# Patient Record
Sex: MALE | Race: WHITE
[De-identification: names, ages, dates, MRNs, and addresses within clinical notes are randomized per-mention and may not be internally consistent; named-entity substitution may affect disease eponyms.]

---

## 2020-04-04 ENCOUNTER — HOSPITAL ENCOUNTER (EMERGENCY)
Dept: HOSPITAL 11 - JP.ED | Age: 20
Discharge: HOME | End: 2020-04-04
Payer: COMMERCIAL
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
## 2020-04-04 DIAGNOSIS — Z88.6: ICD-10-CM

## 2020-04-04 DIAGNOSIS — S40.011A: Primary | ICD-10-CM

## 2020-04-04 DIAGNOSIS — Z88.0: ICD-10-CM

## 2020-04-06 NOTE — CR
Shoulder Comp Rt

 

CLINICAL HISTORY: Fall, pain

 

FINDINGS: There is no acute fracture or dislocation in the right shoulder.

Articular surfaces are smooth

 

Impression: Negative

## 2020-04-07 ENCOUNTER — HOSPITAL ENCOUNTER (EMERGENCY)
Dept: HOSPITAL 11 - JP.ED | Age: 20
Discharge: HOME | End: 2020-04-07
Payer: COMMERCIAL
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
## 2020-04-07 DIAGNOSIS — Z88.0: ICD-10-CM

## 2020-04-07 DIAGNOSIS — S93.491A: Primary | ICD-10-CM

## 2020-04-07 DIAGNOSIS — Z88.5: ICD-10-CM

## 2020-04-07 NOTE — EDM.PDOC
ED HPI GENERAL MEDICAL PROBLEM





- General


Chief Complaint: Upper Extremity Injury/Pain


Stated Complaint: RIGHT SHOULDER PAIN


Time Seen by Provider: 04/07/20 20:30


Source of Information: Reports: Patient


History Limitations: Reports: No Limitations





- History of Present Illness


INITIAL COMMENTS - FREE TEXT/NARRATIVE: 





19-year-old male returns with right shoulder pain.  He was seen 3 days ago and 

likely has an AC injury, although a rotator cuff injury is possible as well.  X-

ray was negative, he returns today for pain control and an inability to abduct 

the arm past 10 or 20 degrees.  No paresthesias.


Onset: Sudden


Duration: Day(s): (3 days ago)


Location: Reports: Upper Extremity, Right


Associated Symptoms: Reports: No Other Symptoms


  ** Right Upper Shoulder


Pain Score (Numeric/FACES): 7





- Related Data


 Allergies











Allergy/AdvReac Type Severity Reaction Status Date / Time


 


Penicillins Allergy  Hives Verified 04/07/20 20:37


 


tramadol Allergy  Hives Verified 04/07/20 20:37











Home Meds: 


 Home Meds





Hydrocodone/Acetaminophen [Hydrocodon-Acetaminophen 5-325] 1 tab PO Q6H 04/07/ 20 [History]











Past Medical History


Gastrointestinal History: Reports: Other (See Below)


Other Gastrointestinal History: lipoma removal on upper abd


Musculoskeletal History: Reports: Fracture





Social & Family History





- Family History


Family Medical History: Noncontributory





- Caffeine Use


Caffeine Use: Reports: Energy Drinks





Review of Systems





- Review of Systems


Review Of Systems: See Below


Constitutional: Denies: Fever


Respiratory: Reports: No Symptoms


Cardiovascular: Reports: No Symptoms


Musculoskeletal: Denies: Neck Pain


Skin: Denies: Bruising, Rash, Erythema


Neurological: Reports: No Symptoms





ED EXAM, GENERAL





- Physical Exam


Exam: See Below


Exam Limited By: No Limitations


General Appearance: Alert, No Apparent Distress


Neck: Supple, Non-Tender


Respiratory/Chest: No Respiratory Distress


Extremities: Other (Sling was removed, shoulder reexamined.  Patient is still 

real tender over the AC joint area but there is no step-off or crepitus.  He is 

unable to actively abduct his arm past 20 degrees, and has marked difficulty 

holding his arm out at 90 degrees after passive abduction.)





Course





- Vital Signs


Last Recorded V/S: 


 Last Vital Signs











Temp  97.3 F   04/07/20 20:34


 


Pulse  82   04/07/20 20:34


 


Resp  16   04/07/20 20:34


 


BP  145/90 H  04/07/20 20:34


 


Pulse Ox  97   04/07/20 20:34














- Orders/Labs/Meds


Orders: 


 Active Orders 24 hr











 Category Date Time Status


 


 Consult to [facility]opedic Clinic [CONS] Routine Cons  04/07/20 20:34 Active














- Re-Assessments/Exams


Free Text/Narrative Re-Assessment/Exam: 





04/07/20 20:43


Patient was given an additional 10 hydrocodone to use for extra pain control, 

and I asked him to see Dr. Tolbert in consultation over the next 48 hours.  

Information was obtained to contact the patient and hopefully he can be seen in 

the next few days.  He can continue to increase activity if tolerated.





Departure





- Departure


Time of Disposition: 20:53


Disposition: Home, Self-Care 01


Clinical Impression: 


Sprain of right shoulder


Qualifiers:


 Encounter type: subsequent encounter Shoulder sprain type: other part of 

shoulder region Qualified Code(s): S43.491D - Other sprain of right shoulder 

joint, subsequent encounter








- Discharge Information


Instructions:  Shoulder Sprain


Referrals: 


PCP,None [Primary Care Provider] - 


Forms:  ED Department Discharge


Care Plan Goals: 


Continue to wear the sling for comfort, and anti-inflammatory is beneficial and 

add stronger pain medication if needed.  Recheck with Dr. Tolbert in the next 

few days as discussed, they should be contacting you tomorrow for a time.





Sepsis Event Note





- Evaluation


Sepsis Screening Result: No Definite Risk





- Focused Exam


Vital Signs: 


 Vital Signs











  Temp Pulse Resp BP Pulse Ox


 


 04/07/20 20:34  97.3 F  82  16  145/90 H  97











Date Exam was Performed: 04/07/20


Time Exam was Performed: 21:57





- My Orders


Last 24 Hours: 


My Active Orders





04/07/20 20:34


Consult to Orthopedic Clinic [CONS] Routine 














- Assessment/Plan


Last 24 Hours: 


My Active Orders





04/07/20 20:34


Consult to Orthopedic Clinic [CONS] Routine

## 2020-07-02 ENCOUNTER — HOSPITAL ENCOUNTER (EMERGENCY)
Dept: HOSPITAL 11 - JP.ED | Age: 20
LOS: 1 days | Discharge: HOME | End: 2020-07-03
Payer: COMMERCIAL

## 2020-07-02 DIAGNOSIS — S43.491D: Primary | ICD-10-CM

## 2020-07-02 DIAGNOSIS — R00.0: ICD-10-CM

## 2020-07-02 DIAGNOSIS — F17.210: ICD-10-CM

## 2020-07-02 DIAGNOSIS — Z88.6: ICD-10-CM

## 2020-07-02 DIAGNOSIS — Z88.5: ICD-10-CM
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
## 2020-07-02 DIAGNOSIS — Z88.0: ICD-10-CM

## 2020-07-03 ENCOUNTER — HOSPITAL ENCOUNTER (EMERGENCY)
Dept: HOSPITAL 11 - JP.ED | Age: 20
Discharge: ANOTHER HOSPITAL | End: 2020-07-03
Payer: COMMERCIAL
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
## 2020-07-03 DIAGNOSIS — Z88.0: ICD-10-CM

## 2020-07-03 DIAGNOSIS — S46.911A: Primary | ICD-10-CM

## 2020-07-03 DIAGNOSIS — Z79.899: ICD-10-CM

## 2020-07-03 DIAGNOSIS — F17.210: ICD-10-CM

## 2020-07-03 DIAGNOSIS — Z88.5: ICD-10-CM

## 2020-07-03 NOTE — EDM.PDOC
ED HPI GENERAL MEDICAL PROBLEM





- General


Chief Complaint: Upper Extremity Injury/Pain


Stated Complaint: RIGHT SHOULDER INJURY


Time Seen by Provider: 07/03/20 01:25


Source of Information: Reports: Patient


History Limitations: Reports: No Limitations





- History of Present Illness


INITIAL COMMENTS - FREE TEXT/NARRATIVE: 





Patient presents for evaluation of pain in the right shoulder region after he 

slipped getting out of the shower tonight and landed on his right shoulder.  

There was no loss of consciousness.  Since that time, if he keeps his right arm 

close to his side, it feels better but not pain-free.  Overhead movements of the

arm are extremely uncomfortable but rotational movements of the arm are not too 

bad.  The left side was not injured.  He has some discomfort in the vicinity of 

the right shoulder blade but the majority of the pain is lateral to the right 

shoulder.


Onset: Today


Duration: Hour(s):


Location: Reports: Upper Extremity, Right


Quality: Reports: Ache, Dull


Severity: Moderate


Improves with: Reports: None


Worsens with: Reports: Movement


Associated Symptoms: Reports: No Other Symptoms


  ** Right Shoulder


Pain Score (Numeric/FACES): 7





- Related Data


                                    Allergies











Allergy/AdvReac Type Severity Reaction Status Date / Time


 


acetaminophen Allergy  Vomiting Verified 07/02/20 23:56





[From Tylenol-Codeine #3]     


 


codeine Allergy  Vomiting Verified 07/02/20 23:56





[From Tylenol-Codeine #3]     


 


Penicillins Allergy  Hives Verified 04/07/20 20:37


 


tramadol Allergy  Hives Verified 04/07/20 20:37











Home Meds: 


                                    Home Meds





Hydrocodone/Acetaminophen [Hydrocodon-Acetaminophen 5-325] 1 tab PO Q6H 04/07/20

[History]


Ketorolac [Toradol] 10 mg PO TID PRN 07/02/20 [History]











Past Medical History


Gastrointestinal History: Reports: Other (See Below)


Other Gastrointestinal History: lipoma removal on upper abd


Musculoskeletal History: Reports: Fracture, Other (See Below)


Other Musculoskeletal History: recurrent right shoulder injuries


Neurological History: Reports: Seizure





- Past Surgical History


GI Surgical History: Reports: Hernia, Inguinal





Social & Family History





- Family History


Family Medical History: Noncontributory





- Tobacco Use


Smoking Status *Q: Current Every Day Smoker


Years of Tobacco use: 6


Packs/Tins Daily: 0.5


Second Hand Smoke Exposure: No





- Caffeine Use


Caffeine Use: Reports: None





- Recreational Drug Use


Recreational Drug Use: No





Review of Systems





- Review of Systems


Review Of Systems: Comprehensive ROS is negative, except as noted in HPI.





ED EXAM, GENERAL





- Physical Exam


Exam: See Below


Free Text/Narrative:: 





This is a comfortable appearing male seated on the table in room 2.


Exam Limited By: No Limitations


General Appearance: Alert, No Apparent Distress


Respiratory/Chest: No Respiratory Distress, No Accessory Muscle Use


Cardiovascular: Regular Rate, Rhythm, Tachycardia


Back Exam: Other (There is diffuse discomfort over the right scapula.)


Extremities: Limited Range of Motion (Attempts at passive upward extension of 

the right humerus are met with pain.  Internal and external rotation of the 

humerus with the arm hanging straight down are not particularly uncomfortable.  

The acromioclavicular joint is tender but no crepitus is felt.)





Course





- Vital Signs


Last Recorded V/S: 


                                Last Vital Signs











Temp  36.0 C L  07/02/20 23:58


 


Pulse  104 H  07/02/20 23:58


 


Resp  16   07/02/20 23:58


 


BP  144/92 H  07/02/20 23:58


 


Pulse Ox  97   07/02/20 23:58














- Re-Assessments/Exams


Free Text/Narrative Re-Assessment/Exam: 





07/03/20 03:21


X-ray of right shoulder ordered and reviewed by me shows no acute findings.  The

 AC joint is normal in appearance.  I think he has contused and strained the 

shoulder but do not see anything alarming.  I recommend ice packs to the 

affected area 20 minutes off and on.  He should use ibuprofen 800 mg 3 times a 

day or naproxen 440 mg twice a day for discomfort.  He asked how it is that he 

would be able to work today and I recommend he discuss this with his supervisor.

  Certain activities such as driving do not appear to be is uncomfortable as 

overhead lifting.  He may be able to have some sort of lighter duty assignment 

today.  Questions answered.





Departure





- Departure


Time of Disposition: 03:24


Disposition: Home, Self-Care 01


Clinical Impression: 


Contusion of shoulder, right


Qualifiers:


 Encounter type: initial encounter Qualified Code(s): S40.011A - Contusion of 

right shoulder, initial encounter





Sprain of right shoulder


Qualifiers:


 Encounter type: subsequent encounter Shoulder sprain type: other part of 

shoulder region Qualified Code(s): S43.491D - Other sprain of right shoulder 

joint, subsequent encounter








- Discharge Information


Instructions:  Contusion, Easy-to-Read


Referrals: 


PCP,None [Primary Care Provider] - 


Forms:  ED Department Discharge


Additional Instructions: 


Cold packs to painful area 20 minutes off and on.  Avoid painful movements of 

the shoulder to the extent possible.  Ibuprofen 800 mg 3 times a day or naproxen

440 mg twice a day regularly over the next 5 days for pain.  Recheck in primary 

care if not improved in 7 to 10 days.





Sepsis Event Note (ED)





- Evaluation


Sepsis Screening Result: No Definite Risk





- Focused Exam


Vital Signs: 


                                   Vital Signs











  Temp Pulse Resp BP Pulse Ox


 


 07/02/20 23:58  36.0 C L  104 H  16  144/92 H  97


 


 07/02/20 23:45  36.0 C L  104 H  16  144/92 H  97

## 2020-07-03 NOTE — CRLCR
INDICATION: Fall onto right shoulder region pain



TECHNIQUE: Shoulder radiograph 3 views right



COMPARISON: 4/4/2020



FINDINGS: 



Bone: No acute fractures or aggressive bone lesions are identified. 



Joint: The glenohumeral joint is unremarkable. The acromioclavicular joint 

is unremarkable. 



Soft tissue: Unremarkable. The visualized hemithorax is unremarkable in 

appearance. No radiopaque foreign bodies are seen. 



IMPRESSION: 



1. No acute osseous injuries or abnormalities are noted. 



Dictated by: Hoang Pantoja MD @ 07/03/2020 02:20:51



(Electronically Signed)

## 2020-07-03 NOTE — EDM.PDOC
ED HPI GENERAL MEDICAL PROBLEM





- General


Chief Complaint: Upper Extremity Injury/Pain


Stated Complaint: R SHOULDER PAIN


Time Seen by Provider: 07/03/20 19:12


Source of Information: Reports: Patient


History Limitations: Reports: No Limitations





- History of Present Illness


INITIAL COMMENTS - FREE TEXT/NARRATIVE: 





19-year-old male injured his right shoulder last night when he fell into the 

wall while taking a shower.  He was seen in the ER by Dr. Lopez and x-ray was 

read as normal by the radiologist.  The patient was advised to take OTC 

analgesics.  He returns today because of distant and increased pain.  He has 

increased pain with any movement of the right arm.  He denies any injuries.  He 

denies any neck pain.  He has no prior neck problems.


  ** Right Shoulder


Pain Score (Numeric/FACES): 8





- Related Data


                                    Allergies











Allergy/AdvReac Type Severity Reaction Status Date / Time


 


acetaminophen Allergy  Vomiting Verified 07/03/20 18:50





[From Tylenol-Codeine #3]     


 


codeine Allergy  Vomiting Verified 07/03/20 18:50





[From Tylenol-Codeine #3]     


 


Penicillins Allergy  Hives Verified 07/03/20 18:50


 


tramadol Allergy  Hives Verified 07/03/20 18:50











Home Meds: 


                                    Home Meds





Ketorolac [Toradol] 10 mg PO TID PRN 07/02/20 [History]











Past Medical History


Gastrointestinal History: Reports: Other (See Below)


Other Gastrointestinal History: lipoma removal on upper abd


Musculoskeletal History: Reports: Fracture, Other (See Below)


Other Musculoskeletal History: recurrent right shoulder injuries


Neurological History: Reports: Seizure





- Past Surgical History


GI Surgical History: Reports: Hernia, Inguinal





Social & Family History





- Family History


Family Medical History: Noncontributory





- Tobacco Use


Smoking Status *Q: Current Every Day Smoker


Years of Tobacco use: 5


Packs/Tins Daily: 0.5





- Caffeine Use


Caffeine Use: Reports: None





- Recreational Drug Use


Recreational Drug Use: No





Review of Systems





- Review of Systems


Review Of Systems: See Below


Constitutional: Reports: No Symptoms


Respiratory: Reports: No Symptoms


Cardiovascular: Reports: No Symptoms


Musculoskeletal: Reports: Other (Shoulder pain)


Skin: Reports: No Symptoms


Neurological: Denies: Numbness, Weakness





ED EXAM, GENERAL





- Physical Exam


Exam: See Below


Exam Limited By: No Limitations


General Appearance: Alert, WD/WN, No Apparent Distress


Peripheral Pulses: 4+: Radial (R)


Extremities: Other (The patient has a normal-appearing right shoulder on 

inspection.  External rotation is almost to 90 degrees as is abduction.  He has 

tenderness on palpation of the anterior shoulder.  No bony deformities were 

palpated.)





Course





- Vital Signs


Text/Narrative:: 





This patient presents with a right shoulder injury sustained last night.  He had

 an x-ray when he visited the ER last night and it was read as normal by the 

radiologist.  He has pain with movement.  His exam shows nearly 90 degrees of 

abduction.  He has some restriction in external rotation.  Palpation of the arm 

reveals no bony abnormalities.  Neurovascular exam is normal and radial pulses 

good.  The patient has a right shoulder strain.  He was given a sling which she 

will use for couple days and then exercises as tolerated.  He was given 4 

tablets of Vicodin 5/325 mg 1 or 2 tablets every 6 hours as needed for pain.  He

 can use OTC analgesics.  He will follow-up with orthopedics or primary care 

next week if he is not improving.


Last Recorded V/S: 


                                Last Vital Signs











Temp  34.7 C L  07/03/20 18:51


 


Pulse  71   07/03/20 18:51


 


Resp  13   07/03/20 18:51


 


BP  127/85   07/03/20 18:51


 


Pulse Ox  96   07/03/20 18:51














- Orders/Labs/Meds


Orders: 


                               Active Orders 24 hr











 Category Date Time Status


 


 DME for Discharge [COMM] Stat Oth  07/03/20 19:11 Ordered














Departure





- Departure


Time of Disposition: 19:15


Disposition: Admitted As Inpatient 66


Condition: Good


Clinical Impression: 


 Right shoulder strain








- Discharge Information


*PRESCRIPTION DRUG MONITORING PROGRAM REVIEWED*: Yes


*COPY OF PRESCRIPTION DRUG MONITORING REPORT IN PATIENT MISSY: No


Referrals: 


PCP,None [Primary Care Provider] - 


Forms:  ED Department Discharge


Additional Instructions: 


The arm sling as needed.  Take Vicodin as needed for pain.  Follow-up with her 

primary care provider next week if you have persistent problems and see no 

improvement.  Return to the ER as needed.





Sepsis Event Note (ED)





- Evaluation


Sepsis Screening Result: No Definite Risk





- Focused Exam


Vital Signs: 


                                   Vital Signs











  Temp Pulse Resp BP Pulse Ox


 


 07/03/20 18:51  34.7 C L  71  13  127/85  96


 


 07/03/20 18:46  34.7 C L  71  13  127/85  96














- My Orders


Last 24 Hours: 


My Active Orders





07/03/20 19:11


DME for Discharge [COMM] Stat 














- Assessment/Plan


Last 24 Hours: 


My Active Orders





07/03/20 19:11


DME for Discharge [COMM] Stat

## 2020-07-07 ENCOUNTER — HOSPITAL ENCOUNTER (EMERGENCY)
Dept: HOSPITAL 11 - JP.ED | Age: 20
Discharge: HOME | End: 2020-07-07
Payer: COMMERCIAL

## 2020-07-07 DIAGNOSIS — M25.511: Primary | ICD-10-CM

## 2020-07-07 DIAGNOSIS — Z88.6: ICD-10-CM

## 2020-07-07 DIAGNOSIS — Z88.0: ICD-10-CM

## 2020-07-07 DIAGNOSIS — Z88.5: ICD-10-CM

## 2020-07-07 NOTE — EDM.PDOC
ED HPI GENERAL MEDICAL PROBLEM





- General


Chief Complaint: Upper Extremity Injury/Pain


Stated Complaint: RIGHT SHOULDER PAIN


Time Seen by Provider: 07/07/20 21:32


Source of Information: Reports: Patient


History Limitations: Reports: No Limitations





- History of Present Illness


INITIAL COMMENTS - FREE TEXT/NARRATIVE: 





19 years old male patient presented to the ER with chief complaint of right 

shoulder pain. Patient had a fall and was seen few days ago and had a negative 

x-ray. Was given prescription for Norco and NSAIDs. Patient stated it was 

helping but he ran out of [place] today. Complaining of continuous pain with any 

movement. No swelling or erythema or deformity. No other complaints.





- Related Data


                                    Allergies











Allergy/AdvReac Type Severity Reaction Status Date / Time


 


acetaminophen Allergy  Vomiting Verified 07/07/20 21:28





[From Tylenol-Codeine #3]     


 


codeine Allergy  Vomiting Verified 07/07/20 21:28





[From Tylenol-Codeine #3]     


 


Penicillins Allergy  Hives Verified 07/07/20 21:28


 


tramadol Allergy  Hives Verified 07/07/20 21:28











Home Meds: 


                                    Home Meds





Ketorolac [Toradol] 10 mg PO TID PRN 07/02/20 [History]


Hydrocodone/Acetaminophen [Hydrocodon-Acetaminophen 5-325] 1 each PO ASDIRECTED 

07/07/20 [History]











Past Medical History


Gastrointestinal History: Reports: Other (See Below)


Other Gastrointestinal History: lipoma removal on upper abd


Musculoskeletal History: Reports: Fracture, Other (See Below)


Other Musculoskeletal History: recurrent right shoulder injuries


Neurological History: Reports: Seizure





- Past Surgical History


GI Surgical History: Reports: Hernia, Inguinal





Social & Family History





- Family History


Family Medical History: Noncontributory





- Caffeine Use


Caffeine Use: Reports: None





Review of Systems





- Review of Systems


Review Of Systems: Comprehensive ROS is negative, except as noted in HPI.





ED EXAM, GENERAL





- Physical Exam


Exam: See Below


Exam Limited By: No Limitations


General Appearance: Alert, WD/WN, No Apparent Distress


Nose: Normal Inspection, Normal Mucosa, No Blood


Head: Atraumatic, Normocephalic


Neck: Normal Inspection, Supple, Non-Tender, Full Range of Motion


Respiratory/Chest: No Respiratory Distress, Lungs Clear, Normal Breath Sounds, 

No Accessory Muscle Use, Chest Non-Tender


Cardiovascular: Normal Peripheral Pulses, Regular Rate, Rhythm, No Edema, No 

Gallop, No JVD, No Murmur, No Rub


Extremities: Normal Inspection, Limited Range of Motion, Other (Tenderness on 

palpation of the right shoulder. No erythema or swelling or deformity. CMS 

intact. Pain limitation of the range of motion.).  No: Joint Swelling, Increased

 Warmth, Mottled





Course





- Vital Signs


Last Recorded V/S: 


                                Last Vital Signs











Temp  36.3 C   07/07/20 21:34


 


Pulse  73   07/07/20 21:34


 


Resp  16   07/07/20 21:34


 


BP  130/88   07/07/20 21:34


 


Pulse Ox  97   07/07/20 21:34














- Re-Assessments/Exams


Free Text/Narrative Re-Assessment/Exam: 





07/07/20 21:44


Patient was seen and examined shortly after arrival. Stable. Possible rotator 

cuff injury. Advised follow-up was his primary doctor for MRI of the right 

shoulder or orthopedic. Stated Tylenol and ibuprofen not helping. Requesting 

Norco. Given 6 more pills. Advised not to drive or operate Machines when taken 

Norco. Maximum total amount of acetaminophen [place] is for gram. Advised to ice 

it. Continue ibuprofen. Come back for any concern or any worsening symptom. 

Patient agrees with the plan. Stable for discharge.





Departure





- Departure


Time of Disposition: 21:45


Disposition: Home, Self-Care 01


Condition: Good


Clinical Impression: 


 Shoulder pain, right








- Discharge Information


Referrals: 


PCP,None [Primary Care Provider] - 


Forms:  ED Department Discharge


Care Plan Goals: 


Advised follow-up was his primary doctor for MRI of the right shoulder or orth

opedic. Stated Tylenol and ibuprofen not helping. Requesting Norco. Given 6 more

pills. Advised not to drive or operate Machines when taken Norco. Maximum total 

amount of acetaminophen [place] is for gram. Advised to ice it. Continue 

ibuprofen. Come back for any concern or any worsening symptom. Patient agrees 

with the plan. 





Sepsis Event Note (ED)





- Focused Exam


Vital Signs: 


                                   Vital Signs











  Temp Pulse Resp BP Pulse Ox


 


 07/07/20 21:34  36.3 C  73  16  130/88  97


 


 07/07/20 21:26  36.3 C  73  16  130/88  97














- Assessment/Plan


Plan: 





Advised follow-up was his primary doctor for MRI of the right shoulder or 

orthopedic. Stated Tylenol and ibuprofen not helping. Requesting Norco. Given 6 

more pills. Advised not to drive or operate Machines when taken Norco. Maximum 

total amount of acetaminophen [place] is for gram. Advised to ice it. Continue 

ibuprofen. Come back for any concern or any worsening symptom. Patient agrees 

with the plan.

## 2020-08-09 ENCOUNTER — HOSPITAL ENCOUNTER (EMERGENCY)
Dept: HOSPITAL 11 - JP.ED | Age: 20
Discharge: HOME | End: 2020-08-09
Payer: COMMERCIAL
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
## 2020-08-09 DIAGNOSIS — M25.511: Primary | ICD-10-CM

## 2020-08-09 DIAGNOSIS — Z88.6: ICD-10-CM

## 2020-08-09 DIAGNOSIS — G89.29: ICD-10-CM

## 2020-08-09 DIAGNOSIS — F17.210: ICD-10-CM

## 2020-08-09 DIAGNOSIS — Z88.0: ICD-10-CM

## 2020-08-09 DIAGNOSIS — Z88.5: ICD-10-CM

## 2020-08-09 PROCEDURE — 99283 EMERGENCY DEPT VISIT LOW MDM: CPT

## 2020-08-09 PROCEDURE — 73030 X-RAY EXAM OF SHOULDER: CPT

## 2020-08-09 NOTE — EDM.PDOC
ED HPI GENERAL MEDICAL PROBLEM





- General


Chief Complaint: Upper Extremity Injury/Pain


Stated Complaint: FALL/RT SHOULDER PAIN


Time Seen by Provider: 08/09/20 21:57


Source of Information: Reports: Patient, RN Notes Reviewed


History Limitations: Reports: No Limitations





- History of Present Illness


INITIAL COMMENTS - FREE TEXT/NARRATIVE: 





19-year-old gentleman presents emergency department a complaint of right 

shoulder pain, he injured himself earlier today when he fell on outstretched 

hand injuring his shoulder now he has difficulty raising his arm he can do about

15 degrees abduction before pain





- Related Data


                                    Allergies











Allergy/AdvReac Type Severity Reaction Status Date / Time


 


acetaminophen Allergy  Vomiting Verified 08/09/20 21:24





[From Tylenol-Codeine #3]     


 


codeine Allergy  Vomiting Verified 08/09/20 21:24





[From Tylenol-Codeine #3]     


 


Penicillins Allergy  Hives Verified 08/09/20 21:24


 


tramadol Allergy  Hives Verified 08/09/20 21:24











Home Meds: 


                                    Home Meds





Ketorolac [Toradol] 10 mg PO TID PRN 07/02/20 [History]











Past Medical History


Gastrointestinal History: Reports: Other (See Below)


Other Gastrointestinal History: lipoma removal on upper abd


Musculoskeletal History: Reports: Fracture, Other (See Below)


Other Musculoskeletal History: recurrent right shoulder injuries


Neurological History: Reports: Seizure





- Past Surgical History


GI Surgical History: Reports: Hernia, Inguinal





Social & Family History





- Family History


Family Medical History: Noncontributory





- Tobacco Use


Smoking Status *Q: Current Every Day Smoker


Years of Tobacco use: 5


Packs/Tins Daily: 0.5





- Caffeine Use


Caffeine Use: Reports: None





Review of Systems





- Review of Systems


Review Of Systems: See Below


Constitutional: Reports: No Symptoms


Musculoskeletal: Reports: Shoulder Pain





ED EXAM, GENERAL





- Physical Exam


Exam: See Below


Free Text/Narrative:: 





Examination right shoulder I do not appreciate any specific point tenderness 

there is no erythema there is no edema there is no deformity noted has about 10 

to 15 degrees abduction for movement before pain


Exam Limited By: No Limitations


General Appearance: Alert, WD/WN, No Apparent Distress





Course





- Vital Signs


Last Recorded V/S: 


                                Last Vital Signs











Temp  98.2 F   08/09/20 21:29


 


Pulse  70   08/09/20 21:29


 


Resp  16   08/09/20 21:29


 


BP  140/94 H  08/09/20 21:29


 


Pulse Ox  98   08/09/20 21:29














- Orders/Labs/Meds


Orders: 


                               Active Orders 24 hr











 Category Date Time Status


 


 Notify Provider Consults [RC] ASDIRECTED Care  08/09/20 22:15 Ordered


 


 Consult to Orthopedic Clinic [CONS] Routine Cons  08/09/20 22:15 Ordered


 


 Consult to Physician [CONS] Routine Cons  08/09/20 22:15 Ordered


 


 Shoulder Comp Rt [CR] Stat Exams  08/09/20 22:00 Ordered











Meds: 


Medications














Discontinued Medications














Generic Name Dose Route Start Last Admin





  Trade Name Fahad  PRN Reason Stop Dose Admin


 


Acetaminophen  650 mg  08/09/20 22:00  08/09/20 22:04





  Tylenol  PO  08/09/20 22:01  650 mg





  NOW ONE   Administration














- Re-Assessments/Exams


Free Text/Narrative Re-Assessment/Exam: 





08/09/20 22:08


He has had a total of 5 visits to the emergency department this year for right 

shoulder pain all describing some type of a fall and then follow-up because pain

 was not a control has received narcotics about half of the occasion.  I did re

view the [place] prescription drug monitoring program his overdose risk is at 

200 last 3 prescriptions for narcotics were from this emergency department.





Departure





- Departure


Time of Disposition: 22:17


Disposition: Home, Self-Care 01


Condition: Fair


Clinical Impression: 


Shoulder pain, right


Qualifiers:


 Chronicity: chronic Qualified Code(s): M25.511 - Pain in right shoulder; G89.29

 - Other chronic pain








- Discharge Information


Instructions:  Shoulder Pain


Referrals: 


PCP,None [Primary Care Provider] - 


Forms:  ED Department Discharge


Additional Instructions: 


Continue to use your Toradol as needed for pain control, the orthopedic clinic 

will call you for an appointment time tomorrow morning





Sepsis Event Note (ED)





- Evaluation


Sepsis Screening Result: No Definite Risk





- Focused Exam


Vital Signs: 


                                   Vital Signs











  Temp Pulse Resp BP Pulse Ox


 


 08/09/20 21:29  98.2 F  70  16  140/94 H  98


 


 08/09/20 21:08  98.2 F  70  16  140/94 H  98














- My Orders


Last 24 Hours: 


My Active Orders





08/09/20 22:00


Shoulder Comp Rt [CR] Stat 





08/09/20 22:15


Notify Provider Consults [RC] ASDIRECTED 


Consult to Orthopedic Clinic [CONS] Routine 


Consult to Physician [CONS] Routine 














- Assessment/Plan


Last 24 Hours: 


My Active Orders





08/09/20 22:00


Shoulder Comp Rt [CR] Stat 





08/09/20 22:15


Notify Provider Consults [RC] ASDIRECTED 


Consult to Orthopedic Clinic [CONS] Routine 


Consult to Physician [CONS] Routine 











Plan: 





Assessment





Acuity = acute on chronic





Site and laterality = right shoulder pain





Etiology  = multiple traumatic falls





Manifestations = none





Location of injury =  Home





Lab values = x-ray reveals no fracture official read radiologist pending





Plan


Consultation set up with orthopedics this week continue to use anti-

inflammatories for pain control

















 This note was dictated using dragon voice recognition software please call with

 any questions on syntax or grammar.

## 2020-08-10 ENCOUNTER — HOSPITAL ENCOUNTER (EMERGENCY)
Dept: HOSPITAL 11 - JP.ED | Age: 20
Discharge: HOME | End: 2020-08-10
Payer: COMMERCIAL

## 2020-08-10 DIAGNOSIS — Z88.6: ICD-10-CM

## 2020-08-10 DIAGNOSIS — M75.51: Primary | ICD-10-CM

## 2020-08-10 DIAGNOSIS — Z88.5: ICD-10-CM

## 2020-08-10 DIAGNOSIS — Z88.0: ICD-10-CM

## 2020-08-10 PROCEDURE — 99283 EMERGENCY DEPT VISIT LOW MDM: CPT

## 2020-08-10 PROCEDURE — 20610 DRAIN/INJ JOINT/BURSA W/O US: CPT

## 2020-08-10 NOTE — EDM.PDOC
ED HPI GENERAL MEDICAL PROBLEM





- General


Chief Complaint: Upper Extremity Injury/Pain


Stated Complaint: right shoulder pain


Time Seen by Provider: 08/10/20 18:00


Source of Information: Reports: Patient, Old Records


History Limitations: Reports: No Limitations





- History of Present Illness


INITIAL COMMENTS - FREE TEXT/NARRATIVE: 





The patient reports that he tripped over a tire on the ground while working on a

car yesterday landing on his outstretched right hand causing injury to the right

shoulder.  The patient reportedly has previously injured this shoulder about 1 

month ago by similar method.  Review of his records report that he frequently 

presents to the ED with this injury and has returned for inadequate pain control

until he receives narcotic pain medication as noted in Dr Officer's note on 

8/9/2020.


Onset: Sudden


Onset Date: 08/09/20


Duration: No: Improving


Location: Reports: Upper Extremity, Right (Right Shoulder)


Quality: Reports: Ache, Same as Previous Episode, Throbbing


Severity: Moderate


Improves with: Reports: None


Worsens with: Reports: Movement


Context: Reports: Trauma (The patient reports he fell tripping over a tire while

working on a care landing on his outstretched right hand causing pain in the 

right shoulder.  Patient previously injured this shoulder 1 month ago with a 

similar injury.)


Associated Symptoms: Reports: No Other Symptoms


Treatments PTA: Reports: Other Medication(s) (Toradol 10 mg p.o., sling 

immobilization.)


  ** Right Shoulder


Pain Score (Numeric/FACES): 8





- Related Data


                                    Allergies











Allergy/AdvReac Type Severity Reaction Status Date / Time


 


acetaminophen Allergy  Vomiting Verified 08/10/20 18:22





[From Tylenol-Codeine #3]     


 


codeine Allergy  Vomiting Verified 08/10/20 18:22





[From Tylenol-Codeine #3]     


 


Penicillins Allergy  Hives Verified 08/10/20 18:22


 


tramadol Allergy  Hives Verified 08/10/20 18:22











Home Meds: 


                                    Home Meds





Ketorolac [Toradol] 10 mg PO TID PRN 07/02/20 [History]


Hydrocodone/Acetaminophen [Hydrocodon-Acetaminophen 5-325] 1 each PO Q6HR PRN #2

tablet 08/10/20 [Rx]











Past Medical History


Gastrointestinal History: Reports: Other (See Below)


Other Gastrointestinal History: lipoma removal on upper abd


Musculoskeletal History: Reports: Fracture, Other (See Below)


Other Musculoskeletal History: recurrent right shoulder injuries


Neurological History: Reports: Seizure





- Past Surgical History


GI Surgical History: Reports: Hernia, Inguinal





Social & Family History





- Family History


Family Medical History: Noncontributory





- Caffeine Use


Caffeine Use: Reports: None





Review of Systems





- Review of Systems


Review Of Systems: Comprehensive ROS is negative, except as noted in HPI.


Constitutional: Reports: No Symptoms


Eyes: Reports: No Symptoms


Ears: Reports: No Symptoms


Nose: Reports: No Symptoms


Mouth/Throat: Reports: No Symptoms


Respiratory: Reports: No Symptoms


Cardiovascular: Reports: No Symptoms


GI/Abdominal: Reports: No Symptoms


Genitourinary: Reports: No Symptoms


Musculoskeletal: Reports: Shoulder Pain (Right shoulder pain and reduced range 

of motion)


Skin: Reports: No Symptoms


Neurological: Reports: No Symptoms


Psychiatric: Reports: No Symptoms





ED EXAM, GENERAL





- Physical Exam


Exam: See Below


Exam Limited By: No Limitations


General Appearance: Alert, Anxious


Head: Atraumatic


Neck: Normal Inspection


Peripheral Pulses: 2+: Brachial (R), Radial (R)


Extremities: Arm Pain (Pain with palpation over the right subacromial bursa, 

deltoid, supraspinatus insertion and teres minor insertion.), Limited Range of 

Motion (Patient unable to raise the right arm beyond 75 degrees flexion or 

abduction or maintain the arm at this level for more than a minute due to 

pain.).  No: Slow Capillary Refill, Joint Swelling, Increased Warmth


Neurological: Alert, Oriented, No Motor/Sensory Deficits





Course





- Vital Signs


Last Recorded V/S: 


                                Last Vital Signs











Temp  36.0 C L  08/10/20 18:21


 


Pulse  60   08/10/20 18:21


 


Resp  13   08/10/20 18:21


 


BP  125/79   08/10/20 18:21


 


Pulse Ox  98   08/10/20 18:21














- Orders/Labs/Meds


Orders: 


                               Active Orders 24 hr











 Category Date Time Status


 


 Ketorolac [Toradol] Med  08/10/20 18:20 Active





 10 mg PO TID PRN   








                                Medication Orders





Ketorolac Tromethamine (Toradol)  10 mg PO TID PRN


   PRN Reason: Pain


   Stop: 08/15/20 18:21








Meds: 


Medications











Generic Name Dose Route Start Last Admin





  Trade Name Freq  PRN Reason Stop Dose Admin


 


Ketorolac Tromethamine  10 mg  08/10/20 18:20 





  Toradol  PO  08/15/20 18:21 





  TID PRN  





  Pain  














Discontinued Medications














Generic Name Dose Route Start Last Admin





  Trade Name Fahad  PRN Reason Stop Dose Admin


 


Bupivacaine HCl  Confirm  08/10/20 18:04  08/10/20 19:17





  Sensorcaine-Mpf 0.5%  Administered  08/10/20 18:05  Not Given





  Dose  





  10 ml  





  .ROUTE  





  .STK-MED ONE  


 


Bupivacaine HCl  4 ml  08/10/20 18:05  08/10/20 18:25





  Sensorcaine-Mpf 0.5%  INJECT  08/10/20 18:06  4 ml





  ONETIME ONE   Administration


 


Triamcinolone Acetonide  Confirm  08/10/20 18:11  08/10/20 19:17





  Kenalog-40  Administered  08/10/20 18:12  Not Given





  Dose  





  40 mg  





  .ROUTE  





  .STK-MED ONE  


 


Triamcinolone Acetonide  40 mg  08/10/20 18:05  08/10/20 18:26





  Kenalog-40  INJECT  08/10/20 18:06  40 mg





  ASDIRECTED ONE   Administration














- Re-Assessments/Exams


Free Text/Narrative Re-Assessment/Exam: 





08/10/20 18:40


The patient was reassessed 30 minutes after injection into the right subacromial

 bursa and was still complaining of significant pain, but it is improving from 

an 8/10 down to a 6-7/10.


08/10/20 18:43








Departure





- Departure


Time of Disposition: 19:30


Disposition: Home, Self-Care 01


Preliminary Cause of Death *Q: Respiratory Failure


Condition: Good


Clinical Impression: 


 Subacromial bursitis of right shoulder joint








- Discharge Information


*PRESCRIPTION DRUG MONITORING PROGRAM REVIEWED*: Yes


*COPY OF PRESCRIPTION DRUG MONITORING REPORT IN PATIENT MISSY: No


Prescriptions: 


Hydrocodone/Acetaminophen [Hydrocodon-Acetaminophen 5-325] 1 each PO Q6HR PRN #2

tablet


 PRN Reason: Pain (Severe 7-10)


Instructions:  Shoulder Pain, Easy-to-Read, Bursitis, How To Use a Sling, 

Easy-to-Read


Referrals: 


PCP,None [Primary Care Provider] - 


Forms:  ED Department Discharge





Sepsis Event Note (ED)





- Evaluation


Sepsis Screening Result: No Definite Risk





- Focused Exam


Vital Signs: 


                                   Vital Signs











  Temp Pulse Resp BP Pulse Ox


 


 08/10/20 18:21  36.0 C L  60  13  125/79  98


 


 08/10/20 17:44  36.0 C L  60  13  125/79  98














- Problem List & Annotations


(1) Shoulder pain, right


SNOMED Code(s): 84045465, 10427826


   Code(s): M25.511 - PAIN IN RIGHT SHOULDER   Status: Acute   Current Visit: 

Yes   Onset Date: ~08/09/20   


Qualifiers: 


   Chronicity: chronic   Qualified Code(s): M25.511 - Pain in right shoulder; 

G89.29 - Other chronic pain   





- Problem List Review


Problem List Initiated/Reviewed/Updated: Yes





- My Orders


Last 24 Hours: 


My Active Orders





08/10/20 18:20


Ketorolac [Toradol]   10 mg PO TID PRN 














- Assessment/Plan


Last 24 Hours: 


My Active Orders





08/10/20 18:20


Ketorolac [Toradol]   10 mg PO TID PRN 











Plan: 





The patient received a kenalog/bupivicaine injection into the right subacromial 

bursa for acute bursitis.  He has a follow-up with Ortho in 4 days for re-

evaluation.  He still is complaining of 7/10 pain so reluctantly I will send him

 home with a couple of hydrocodone for pain control until the bursa injection 

fully works.  I did explain to him that this would only be a one time dose.  

Indications to return to the ED were discussed and the patient is suitable for 

discharge in satisfactory condition.





Arthrocentesis





- Arthrocentesis


Arthrocentesis Indication: other (acute right subacromial bursitis)


Location: right, shoulder, bursa


Prep: Chlorhexidine


Needle: other (23 ga 1 inch)


Medication Instilled: marcaine mls: (4ml), triamcinolone mgs: (40)


Complications: No


Dressing: gauze

## 2020-08-10 NOTE — CR
Shoulder Comp Rt

 

CLINICAL HISTORY: Pain, fall

 

FINDINGS: There is no acute fracture or dislocation in the right shoulder.

Articular surfaces are smooth

 

Impression: Negative